# Patient Record
Sex: FEMALE | Race: WHITE | NOT HISPANIC OR LATINO | Employment: FULL TIME | URBAN - METROPOLITAN AREA
[De-identification: names, ages, dates, MRNs, and addresses within clinical notes are randomized per-mention and may not be internally consistent; named-entity substitution may affect disease eponyms.]

---
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
## 2021-09-02 ENCOUNTER — APPOINTMENT (OUTPATIENT)
Dept: CT IMAGING | Facility: CLINIC | Age: 21
End: 2021-09-02
Attending: EMERGENCY MEDICINE

## 2021-09-02 ENCOUNTER — HOSPITAL ENCOUNTER (EMERGENCY)
Facility: CLINIC | Age: 21
Discharge: HOME | End: 2021-09-03
Attending: EMERGENCY MEDICINE | Admitting: EMERGENCY MEDICINE

## 2021-09-02 DIAGNOSIS — R19.7 DIARRHEA, UNSPECIFIED TYPE: ICD-10-CM

## 2021-09-02 DIAGNOSIS — T78.2XXA ANAPHYLAXIS, INITIAL ENCOUNTER: ICD-10-CM

## 2021-09-02 LAB
ALBUMIN SERPL-MCNC: 3.8 G/DL (ref 3.5–5)
ALP SERPL-CCNC: 45 U/L (ref 45–120)
ALT SERPL W P-5'-P-CCNC: 13 U/L (ref 0–45)
ANION GAP SERPL CALCULATED.3IONS-SCNC: 8 MMOL/L (ref 5–18)
AST SERPL W P-5'-P-CCNC: 18 U/L (ref 0–40)
BASOPHILS # BLD AUTO: 0.1 10E3/UL (ref 0–0.2)
BASOPHILS NFR BLD AUTO: 1 %
BILIRUB DIRECT SERPL-MCNC: 0.4 MG/DL
BILIRUB SERPL-MCNC: 1.4 MG/DL (ref 0–1)
BUN SERPL-MCNC: 10 MG/DL (ref 8–22)
CALCIUM SERPL-MCNC: 9.9 MG/DL (ref 8.5–10.5)
CHLORIDE BLD-SCNC: 106 MMOL/L (ref 98–107)
CO2 SERPL-SCNC: 27 MMOL/L (ref 22–31)
CREAT SERPL-MCNC: 0.77 MG/DL (ref 0.6–1.1)
EOSINOPHIL # BLD AUTO: 0.1 10E3/UL (ref 0–0.7)
EOSINOPHIL NFR BLD AUTO: 2 %
ERYTHROCYTE [DISTWIDTH] IN BLOOD BY AUTOMATED COUNT: 11.9 % (ref 10–15)
GFR SERPL CREATININE-BSD FRML MDRD: >90 ML/MIN/1.73M2
GLUCOSE BLD-MCNC: 98 MG/DL (ref 70–125)
HCT VFR BLD AUTO: 38.5 % (ref 35–47)
HGB BLD-MCNC: 13.7 G/DL (ref 11.7–15.7)
IMM GRANULOCYTES # BLD: 0 10E3/UL
IMM GRANULOCYTES NFR BLD: 0 %
INTERNAL QC CHECK POCT: NORMAL
LIPASE SERPL-CCNC: 19 U/L (ref 0–52)
LYMPHOCYTES # BLD AUTO: 2.3 10E3/UL (ref 0.8–5.3)
LYMPHOCYTES NFR BLD AUTO: 35 %
MCH RBC QN AUTO: 30.8 PG (ref 26.5–33)
MCHC RBC AUTO-ENTMCNC: 35.6 G/DL (ref 31.5–36.5)
MCV RBC AUTO: 87 FL (ref 78–100)
MONOCYTES # BLD AUTO: 0.6 10E3/UL (ref 0–1.3)
MONOCYTES NFR BLD AUTO: 9 %
NEUTROPHILS # BLD AUTO: 3.4 10E3/UL (ref 1.6–8.3)
NEUTROPHILS NFR BLD AUTO: 53 %
NRBC # BLD AUTO: 0 10E3/UL
NRBC BLD AUTO-RTO: 0 /100
OCCULT BLOOD POCT: NEGATIVE
PLATELET # BLD AUTO: 181 10E3/UL (ref 150–450)
POTASSIUM BLD-SCNC: 4.3 MMOL/L (ref 3.5–5)
PROT SERPL-MCNC: 6.9 G/DL (ref 6–8)
RBC # BLD AUTO: 4.45 10E6/UL (ref 3.8–5.2)
SODIUM SERPL-SCNC: 141 MMOL/L (ref 136–145)
TEST CARD EXPIRATION DATE: NORMAL
TEST CARD LOT NUMBER: NORMAL
WBC # BLD AUTO: 6.6 10E3/UL (ref 4–11)

## 2021-09-02 PROCEDURE — 74177 CT ABD & PELVIS W/CONTRAST: CPT

## 2021-09-02 PROCEDURE — 36415 COLL VENOUS BLD VENIPUNCTURE: CPT | Performed by: EMERGENCY MEDICINE

## 2021-09-02 PROCEDURE — 250N000009 HC RX 250

## 2021-09-02 PROCEDURE — 82248 BILIRUBIN DIRECT: CPT | Performed by: EMERGENCY MEDICINE

## 2021-09-02 PROCEDURE — 96372 THER/PROPH/DIAG INJ SC/IM: CPT | Performed by: EMERGENCY MEDICINE

## 2021-09-02 PROCEDURE — 250N000011 HC RX IP 250 OP 636: Performed by: EMERGENCY MEDICINE

## 2021-09-02 PROCEDURE — 96361 HYDRATE IV INFUSION ADD-ON: CPT

## 2021-09-02 PROCEDURE — 85025 COMPLETE CBC W/AUTO DIFF WBC: CPT | Performed by: EMERGENCY MEDICINE

## 2021-09-02 PROCEDURE — 96375 TX/PRO/DX INJ NEW DRUG ADDON: CPT

## 2021-09-02 PROCEDURE — 250N000011 HC RX IP 250 OP 636

## 2021-09-02 PROCEDURE — 82272 OCCULT BLD FECES 1-3 TESTS: CPT | Performed by: EMERGENCY MEDICINE

## 2021-09-02 PROCEDURE — 99285 EMERGENCY DEPT VISIT HI MDM: CPT | Mod: 25

## 2021-09-02 PROCEDURE — 83690 ASSAY OF LIPASE: CPT | Performed by: EMERGENCY MEDICINE

## 2021-09-02 PROCEDURE — 80048 BASIC METABOLIC PNL TOTAL CA: CPT | Performed by: EMERGENCY MEDICINE

## 2021-09-02 PROCEDURE — 84703 CHORIONIC GONADOTROPIN ASSAY: CPT | Performed by: EMERGENCY MEDICINE

## 2021-09-02 PROCEDURE — 258N000003 HC RX IP 258 OP 636: Performed by: EMERGENCY MEDICINE

## 2021-09-02 PROCEDURE — 96374 THER/PROPH/DIAG INJ IV PUSH: CPT | Mod: 59

## 2021-09-02 RX ORDER — EPINEPHRINE 1 MG/ML
0.3 INJECTION, SOLUTION INTRAMUSCULAR; SUBCUTANEOUS ONCE
Status: COMPLETED | OUTPATIENT
Start: 2021-09-02 | End: 2021-09-02

## 2021-09-02 RX ORDER — METHYLPREDNISOLONE SODIUM SUCCINATE 125 MG/2ML
125 INJECTION, POWDER, LYOPHILIZED, FOR SOLUTION INTRAMUSCULAR; INTRAVENOUS ONCE
Status: COMPLETED | OUTPATIENT
Start: 2021-09-02 | End: 2021-09-02

## 2021-09-02 RX ORDER — DIPHENHYDRAMINE HYDROCHLORIDE 50 MG/ML
INJECTION INTRAMUSCULAR; INTRAVENOUS
Status: COMPLETED
Start: 2021-09-02 | End: 2021-09-02

## 2021-09-02 RX ORDER — DIPHENHYDRAMINE HYDROCHLORIDE 50 MG/ML
25 INJECTION INTRAMUSCULAR; INTRAVENOUS ONCE
Status: COMPLETED | OUTPATIENT
Start: 2021-09-02 | End: 2021-09-02

## 2021-09-02 RX ORDER — IOPAMIDOL 755 MG/ML
100 INJECTION, SOLUTION INTRAVASCULAR ONCE
Status: COMPLETED | OUTPATIENT
Start: 2021-09-02 | End: 2021-09-02

## 2021-09-02 RX ADMIN — METHYLPREDNISOLONE SODIUM SUCCINATE 125 MG: 125 INJECTION, POWDER, FOR SOLUTION INTRAMUSCULAR; INTRAVENOUS at 22:45

## 2021-09-02 RX ADMIN — EPINEPHRINE 0.3 MG: 1 INJECTION INTRAMUSCULAR; INTRAVENOUS; SUBCUTANEOUS at 22:49

## 2021-09-02 RX ADMIN — FAMOTIDINE 20 MG: 10 INJECTION, SOLUTION INTRAVENOUS at 22:47

## 2021-09-02 RX ADMIN — DIPHENHYDRAMINE HYDROCHLORIDE 25 MG: 50 INJECTION INTRAMUSCULAR; INTRAVENOUS at 22:44

## 2021-09-02 RX ADMIN — SODIUM CHLORIDE 1000 ML: 9 INJECTION, SOLUTION INTRAVENOUS at 22:52

## 2021-09-02 RX ADMIN — IOPAMIDOL 100 ML: 755 INJECTION, SOLUTION INTRAVENOUS at 22:35

## 2021-09-02 ASSESSMENT — ENCOUNTER SYMPTOMS
ANAL BLEEDING: 1
DIARRHEA: 1
FATIGUE: 0
COUGH: 0
NAUSEA: 0
RECTAL PAIN: 1
WEAKNESS: 0
CHILLS: 0
HEADACHES: 0
SHORTNESS OF BREATH: 0
VOMITING: 0
DYSURIA: 0
FEVER: 0
ABDOMINAL PAIN: 1

## 2021-09-02 ASSESSMENT — MIFFLIN-ST. JEOR: SCORE: 1407.25

## 2021-09-02 NOTE — ED TRIAGE NOTES
Pt with loose diarrhea x 3 months. States it has been 2-3 times a day. In the last couple days also has seen blood in her stool and has decreased appetite. Has upper mid abdominal pain and states she can feel burning sensation in her lower abdomen

## 2021-09-03 VITALS
TEMPERATURE: 98.4 F | BODY MASS INDEX: 22.34 KG/M2 | OXYGEN SATURATION: 98 % | HEIGHT: 66 IN | WEIGHT: 139 LBS | HEART RATE: 86 BPM | RESPIRATION RATE: 24 BRPM | SYSTOLIC BLOOD PRESSURE: 85 MMHG | DIASTOLIC BLOOD PRESSURE: 52 MMHG

## 2021-09-03 LAB
C COLI+JEJUNI+LARI FUSA STL QL NAA+PROBE: NOT DETECTED
C DIFF TOX B STL QL: POSITIVE
EC STX1 GENE STL QL NAA+PROBE: NOT DETECTED
EC STX2 GENE STL QL NAA+PROBE: NOT DETECTED
NOROV GI+II ORF1-ORF2 JNC STL QL NAA+PR: NOT DETECTED
RVA NSP5 STL QL NAA+PROBE: NOT DETECTED
SALMONELLA SP RPOD STL QL NAA+PROBE: NOT DETECTED
SHIGELLA SP+EIEC IPAH STL QL NAA+PROBE: NOT DETECTED
V CHOL+PARA RFBL+TRKH+TNAA STL QL NAA+PR: NOT DETECTED
Y ENTERO RECN STL QL NAA+PROBE: NOT DETECTED

## 2021-09-03 PROCEDURE — 87493 C DIFF AMPLIFIED PROBE: CPT | Performed by: EMERGENCY MEDICINE

## 2021-09-03 PROCEDURE — 87506 IADNA-DNA/RNA PROBE TQ 6-11: CPT | Performed by: EMERGENCY MEDICINE

## 2021-09-03 RX ORDER — PREDNISONE 20 MG/1
60 TABLET ORAL DAILY
Qty: 12 TABLET | Refills: 0 | Status: SHIPPED | OUTPATIENT
Start: 2021-09-03 | End: 2021-09-07

## 2021-09-03 NOTE — DISCHARGE INSTRUCTIONS
Avoid IV contrast in the future due to your severe allergic reaction  Benadryl 25 to 50 mg every 4-6 hours as needed for allergic symptoms like lip swelling or hives  Imodium as needed for diarrhea  Follow-up with [facility] within the next 1 to 2 days for recheck  Establish primary care and follow-up within the next week  Return to the emergency department for worsening problems or concerns

## 2021-09-03 NOTE — ED NOTES
Pt reports able to breathe without difficulty. Lip swelling is decreasing. VSS. Maldonado sounds clear.

## 2021-09-03 NOTE — ED NOTES
Writer able to obtain blood sample, IV catheter would not advance. Pt prefer no IV at this time. Writer informed pt will return later to place IV.

## 2021-09-03 NOTE — ED PROVIDER NOTES
EMERGENCY DEPARTMENT ENCOUNTER      NAME: Rose Marie Grier  AGE: 21 year old female  YOB: 2000  MRN: [id number]  EVALUATION DATE & TIME: 9/2/2021  8:40 PM    PCP: No primary care provider on file.    ED PROVIDER: Matilde Santillan DO      Chief Complaint   Patient presents with     Diarrhea         FINAL IMPRESSION:  1. Diarrhea, unspecified type    2. Anaphylaxis, initial encounter          ED COURSE & MEDICAL DECISION MAKING:    Pertinent Labs & Imaging studies reviewed. (See chart for details)  8:41 PM PA student met with the patient to obtain history and perform her initial exam.   9:12 PM I met with the patient for the initial interview and physical examination. Discussed plan for treatment and workup in the ED. PPE: Provider wore surgical mask.   10:40 PM Patient back from CT.  Starting sneezing after contrast, feels burning in her throat, swelling and nausea.  Concern for allergic reaction.  Lips are swollen.  Uvula midline and not edematous.  Will order medications for anaphylaxis.    21 year old female presents to the Emergency Department for evaluation of diarrhea, abdominal pain.  Patient with diarrhea over the past couple weeks, increased over the past 2 days.  Associated pain to her rectum and lower abdomen.  She has a family history of Crohn's.  She was treated for UTI around the time that the diarrhea started.  No other known sick contacts.  She is nontoxic-appearing.  She does have some suprapubic tenderness and rectal pain.  Rectum appears irritated.  Plan for labs, CT imaging, I was alerted after her contrast administration that she started to have some swelling and nausea.  She does have edema of her lips.  No uvula swelling.  We will treat for allergic reaction with epinephrine, fluids, Benadryl, Pepcid and methylprednisolone.  Repeat check patient hemodynamically stable.  Will need monitoring.  CT unremarkable.      At the conclusion of the encounter I discussed the results of all of  the tests and the disposition. The questions were answered. The patient or family acknowledged understanding and was agreeable with the care plan.       MEDICATIONS GIVEN IN THE EMERGENCY:  Medications - No data to display    NEW PRESCRIPTIONS STARTED AT TODAY'S ER VISIT  New Prescriptions    No medications on file          =================================================================    HPI    Patient information was obtained from: Patient     Use of : N/A         Rose Marie Grier is a 21 year old female with a pertinent history of viral gastroenteritis who presents to this ED by walk in for evaluation of diarrhea.     Patient reports that she has not had a solid bowel movement over the last 3 months. Her diarrhea has worsened over the past 2 days. She now endorses associated abdominal pain as well as a rectal burning sensation. She has noted blood on the tissue with wiping. Patient put a tampon in to make sure it was not vaginal bleeding as she recently had her menstrual period, but the tampon came out clean. She did not check for hemorrhoids. No reported recent dietary changes or known dietary triggers. She denies fever, nausea, vomiting, or additional symptoms at this time. Patient notes a family history of Crohn's disease. She has had a prior  section. She still has her appendix and her gallbladder.         REVIEW OF SYSTEMS   Review of Systems   Constitutional: Negative for chills, fatigue and fever.   Respiratory: Negative for cough and shortness of breath.    Cardiovascular: Negative for chest pain.   Gastrointestinal: Positive for abdominal pain, anal bleeding, diarrhea and rectal pain. Negative for nausea and vomiting.   Genitourinary: Negative for dysuria and vaginal bleeding.   Skin: Negative.    Neurological: Negative for syncope, weakness and headaches.   All other systems reviewed and are negative.       PAST MEDICAL HISTORY:  History reviewed. No pertinent past medical  history.    PAST SURGICAL HISTORY:  Past Surgical History:   Procedure Laterality Date      SECTION             CURRENT MEDICATIONS:    metoclopramide (REGLAN) 10 MG tablet  naproxen (NAPROSYN) 375 MG tablet         ALLERGIES:  Allergies   Allergen Reactions     Penicillin G Swelling and Rash     Throat swelling       FAMILY HISTORY:  History reviewed. No pertinent family history.    SOCIAL HISTORY:   Social History     Socioeconomic History     Marital status: Single     Spouse name: Not on file     Number of children: Not on file     Years of education: Not on file     Highest education level: Not on file   Occupational History     Not on file   Tobacco Use     Smoking status: Current Every Day Smoker     Types: Cigarettes, Cigarettes     Smokeless tobacco: Never Used   Substance and Sexual Activity     Alcohol use: No     Drug use: No     Sexual activity: Yes     Partners: Male   Other Topics Concern     Not on file   Social History Narrative     Not on file     Social Determinants of Health     Financial Resource Strain:      Difficulty of Paying Living Expenses:    Food Insecurity:      Worried About Running Out of Food in the Last Year:      Ran Out of Food in the Last Year:    Transportation Needs:      Lack of Transportation (Medical):      Lack of Transportation (Non-Medical):    Physical Activity:      Days of Exercise per Week:      Minutes of Exercise per Session:    Stress:      Feeling of Stress :    Social Connections:      Frequency of Communication with Friends and Family:      Frequency of Social Gatherings with Friends and Family:      Attends [employer] Services:      Active Member of Clubs or Organizations:      Attends Club or Organization Meetings:      Marital Status:    Intimate Partner Violence:      Fear of Current or Ex-Partner:      Emotionally Abused:      Physically Abused:      Sexually Abused:        VITALS:  /72   Pulse 87   Temp 98.4  F (36.9  C) (Oral)   Resp 16    "Ht 1.676 m (5' 6\")   Wt 63 kg (139 lb)   LMP 08/26/2021   SpO2 99%   BMI 22.44 kg/m      PHYSICAL EXAM    Physical Exam  Constitutional:       General: She is not in acute distress.  HENT:      Head: Normocephalic and atraumatic.      Mouth/Throat:      Pharynx: Oropharynx is clear.   Eyes:      Pupils: Pupils are equal, round, and reactive to light.   Cardiovascular:      Rate and Rhythm: Normal rate and regular rhythm.      Pulses: Normal pulses.      Heart sounds: Normal heart sounds.   Pulmonary:      Effort: Pulmonary effort is normal.      Breath sounds: Normal breath sounds.   Abdominal:      General: Abdomen is flat.      Palpations: Abdomen is soft.      Tenderness: There is abdominal tenderness in the suprapubic area.   Genitourinary:     Comments: Skin appears irritated around the rectum  No hemorrhoids or masses  No gross blood in the rectal vault   Musculoskeletal:         General: Normal range of motion.   Skin:     General: Skin is warm and dry.      Capillary Refill: Capillary refill takes less than 2 seconds.   Neurological:      General: No focal deficit present.      Mental Status: She is alert and oriented to person, place, and time.             LAB:  All pertinent labs reviewed and interpreted.  Labs Ordered and Resulted from Time of ED Arrival Up to the Time of Departure from the ED   HEPATIC FUNCTION PANEL - Abnormal; Notable for the following components:       Result Value    Bilirubin Total 1.4 (*)     All other components within normal limits   BASIC METABOLIC PANEL - Normal   LIPASE - Normal   OCCULT BLOOD STOOL POCT - Normal   CBC WITH PLATELETS & DIFFERENTIAL    Narrative:     The following orders were created for panel order CBC with platelets differential.  Procedure                               Abnormality         Status                     ---------                               -----------         ------                     CBC with platelets and d...[[id number]]                 "      Final result                 Please view results for these tests on the individual orders.   HCG QUALITATIVE PREGNANCY   CBC WITH PLATELETS AND DIFFERENTIAL   PERIPHERAL IV CATHETER       RADIOLOGY:  Reviewed all pertinent imaging. Please see official radiology report.  CT Abdomen Pelvis w Contrast   Preliminary Result   IMPRESSION:    1.  No acute pathology in the abdomen or pelvis.   2.  Mild splenomegaly.          PROCEDURES:   Critical Care  Performed by: Galina Santillan DO  Authorized by: Galina Santillan DO     Total critical care time: 31 minutes  Critical care time was exclusive of separately billable procedures and treating other patients.  Critical care was necessary to treat or prevent imminent or life-threatening deterioration of the following conditions: Anaphylaxis  Critical care was time spent personally by me on the following activities: development of treatment plan with patient or surrogate, discussions with consultants, examination of patient, evaluation of patient's response to treatment, obtaining history from patient or surrogate, ordering and performing treatments and interventions, ordering and review of laboratory studies, ordering and review of radiographic studies and re-evaluation of patient's condition, this excludes any separately billable procedures.      I, Vivi Gutierres, am serving as a scribe to document services personally performed by Dr. Matilde Santillan based on my observation and the provider's statements to me. IMatilde DO attest that Vivi Gutierres is acting in a scribe capacity, has observed my performance of the services and has documented them in accordance with my direction.    Matilde Santillan DO  Emergency Medicine  [facility] EMERGENCY ROOM  [address] [facility] [id number] [phone]  Dept: [phone]     Matilde Santillan DO  09/02/21 2319

## 2021-09-03 NOTE — ED PROVIDER NOTES
"Transfer of care note ed signout      HPI      Patient reports having non-solid bowel movements for the past 3 months, which has worsened over the past 2 day days. She endorses associated abdominal pain and rectal burning sensation. She notes blood with wiping. Patient denies vaginal bleeding. Denies checking for hemorrhoids. Denies recent dietary changes or known dietary triggers. Patient's has familial history of Crohn's disease. She has personal medical history of  section.      Patient seen by Dr. Santillan  For  diarrhea  and signed out  care at 2305.  Pending studies include a abdomen pelvis CT.  Patient developed anaphylaxis to the IV contrast requiring epinephrine, Solu-Medrol and Benadryl.  We are observing for her allergic symptoms to improve/resolve.      ED COURSE & MEDICAL DECISION MAKING    2305 Patient is signed out to me by Dr. Matilde Santillan.   2356 Introduced myself to patient and reassessed her. Rash has resolved and angioedema of lips is improving. Patient reports she feels a lot better.   0045 Rechecked patient. Patient's angioedema continues to improve. Patient is comfortable with being discharged to home. I discussed the plan for discharge with the patient, and patient is agreeable. We discussed supportive cares at home and reasons for return to the ER including new or worsening symptoms - all questions and concerns addressed. Patient to be discharged by RN.      At the conclusion of the encounter I discussed  the results of all of the tests and the disposition.   All questions were answered.  The patient acknowledged understanding and was agreeable with the care plan.        PHYSICAL EXAM      VITAL SIGNS: BP (!) 89/53   Pulse 92   Temp 98.4  F (36.9  C) (Oral)   Resp 23   Ht 1.676 m (5' 6\")   Wt 63 kg (139 lb)   LMP 2021   SpO2 98%   BMI 22.44 kg/m          LABS  Pertinent lab results reviewed in chart.  Results for orders placed or performed during the hospital encounter " of 09/02/21   CT Abdomen Pelvis w Contrast    Impression    IMPRESSION:   1.  No acute pathology in the abdomen or pelvis.  2.  Mild splenomegaly.   Basic metabolic panel   Result Value Ref Range    Sodium 141 136 - 145 mmol/L    Potassium 4.3 3.5 - 5.0 mmol/L    Chloride 106 98 - 107 mmol/L    Carbon Dioxide (CO2) 27 22 - 31 mmol/L    Anion Gap 8 5 - 18 mmol/L    Urea Nitrogen 10 8 - 22 mg/dL    Creatinine 0.77 0.60 - 1.10 mg/dL    Calcium 9.9 8.5 - 10.5 mg/dL    Glucose 98 70 - 125 mg/dL    GFR Estimate >90 >60 mL/min/1.73m2   Result Value Ref Range    Lipase 19 0 - 52 U/L   Hepatic function panel   Result Value Ref Range    Bilirubin Total 1.4 (H) 0.0 - 1.0 mg/dL    Bilirubin Direct 0.4 <=0.5 mg/dL    Protein Total 6.9 6.0 - 8.0 g/dL    Albumin 3.8 3.5 - 5.0 g/dL    Alkaline Phosphatase 45 45 - 120 U/L    AST 18 0 - 40 U/L    ALT 13 0 - 45 U/L   CBC with platelets and differential   Result Value Ref Range    WBC Count 6.6 4.0 - 11.0 10e3/uL    RBC Count 4.45 3.80 - 5.20 10e6/uL    Hemoglobin 13.7 11.7 - 15.7 g/dL    Hematocrit 38.5 35.0 - 47.0 %    MCV 87 78 - 100 fL    MCH 30.8 26.5 - 33.0 pg    MCHC 35.6 31.5 - 36.5 g/dL    RDW 11.9 10.0 - 15.0 %    Platelet Count 181 150 - 450 10e3/uL    % Neutrophils 53 %    % Lymphocytes 35 %    % Monocytes 9 %    % Eosinophils 2 %    % Basophils 1 %    % Immature Granulocytes 0 %    NRBCs per 100 WBC 0 <1 /100    Absolute Neutrophils 3.4 1.6 - 8.3 10e3/uL    Absolute Lymphocytes 2.3 0.8 - 5.3 10e3/uL    Absolute Monocytes 0.6 0.0 - 1.3 10e3/uL    Absolute Eosinophils 0.1 0.0 - 0.7 10e3/uL    Absolute Basophils 0.1 0.0 - 0.2 10e3/uL    Absolute Immature Granulocytes 0.0 <=0.0 10e3/uL    Absolute NRBCs 0.0 10e3/uL   Occult blood stool POCT   Result Value Ref Range    Occult Blood POCT Negative Negative    Internal QC Check POCT Valid Valid    Test Card Lot Number [id number]     Test Card Expiration Date 8-22          RADIOLOGY  CT Abdomen Pelvis w Contrast    Result Date:  9/2/2021  EXAM: CT ABDOMEN PELVIS W CONTRAST LOCATION: [facility] DATE/TIME: 9/2/2021 10:21 PM INDICATION: Abdominal pain and diarrhea, abscess/infection suspected. COMPARISON: None available. TECHNIQUE: CT scan of the abdomen and pelvis was performed following injection of IV contrast. Multiplanar reformats were obtained. Dose reduction techniques were used. CONTRAST: 100 mL of Isovue-370 FINDINGS: Exam is limited by motion/respiratory artifact. LOWER CHEST: Lung bases are clear. HEPATOBILIARY: No suspicious focal hepatic lesion. The gallbladder is contracted. PANCREAS: No main pancreatic ductal dilatation or definite solid pancreatic mass. SPLEEN: Mild splenomegaly with the spleen measuring 13 cm in craniocaudal dimension. Small splenule along the splenic hilum. ADRENAL GLANDS: No adrenal nodules. KIDNEYS/BLADDER: No radiodense kidney stones or hydronephrosis in either kidney. BOWEL: No abnormally dilated bowel loops. The appendix is visualized and appears normal. PERITONEUM: Trace amount of free fluid in the pelvis, indeterminate, could be physiological. LYMPH NODES: No significant abdominopelvic lymphadenopathy. VASCULATURE: Unremarkable. PELVIC ORGANS: 1.7 cm left adnexal cyst, likely ovarian. MUSCULOSKELETAL: No suspicious osseous lesion.     IMPRESSION: 1.  No acute pathology in the abdomen or pelvis. 2.  Mild splenomegaly.      FINAL DIAGNOSIS:   1. Diarrhea, unspecified type    2. Anaphylaxis, initial encounter             Charity Perera MD  09/03/21 0114

## 2021-09-03 NOTE — ED NOTES
Himanshu RNHannah, was in room when writer arrived. Pt had reaction to IV contrast. Hives all over body, lips swollen and red. Pt denies difficulty breathing. Medications administered.

## 2021-09-03 NOTE — ED NOTES
Returned from CT with CT tech, pt reports sudden onset of sneezing after contrast was injected followed by facial swelling, lip swelling, hives to arms, legs and chest. Reports mild sob and throat tightness, pt is tearful, provider at bs. meds ordered. Placed pt on cardiac monitor, cont saO2 monitoring and bp monitoring, bs are clear, no wheezing

## 2021-09-08 NOTE — TELEPHONE ENCOUNTER
REFERRAL INFORMATION:    Referring Provider:  Dr. Charity Morris     Referring Clinic:  [facility]     Reason for Visit/Diagnosis: Diarrhea      FUTURE VISIT INFORMATION:    Appointment Date: 12/17/2021    Appointment Time: 9 AM      NOTES STATUS DETAILS   OFFICE NOTE from Referring Provider Internal 9/2/2021 ED visit    OFFICE NOTE from Other Specialist N/A    HOSPITAL DISCHARGE SUMMARY/  ED VISITS Internal 9/2/2021   OPERATIVE REPORT N/A    MEDICATION LIST Internal         ENDOSCOPY  N/A    COLONOSCOPY N/A    ERCP N/A    EUS N/A    STOOL TESTING Internal 9/3/2021, 9/2/2021   PERTINENT LABS Internal/ Care Everywhere    PATHOLOGY REPORTS (RELATED) N/A    IMAGING (CT, MRI, EGD, MRCP, Small Bowel Follow Through/SBT, MR/CT Enterography) Internal CT Abdomen Pelvis: 9/2/2021

## 2021-12-17 ENCOUNTER — PRE VISIT (OUTPATIENT)
Dept: GASTROENTEROLOGY | Facility: CLINIC | Age: 21
End: 2021-12-17